# Patient Record
Sex: MALE | Race: WHITE | HISPANIC OR LATINO
[De-identification: names, ages, dates, MRNs, and addresses within clinical notes are randomized per-mention and may not be internally consistent; named-entity substitution may affect disease eponyms.]

---

## 2018-07-06 PROBLEM — Z00.00 ENCOUNTER FOR PREVENTIVE HEALTH EXAMINATION: Status: ACTIVE | Noted: 2018-07-06

## 2018-07-16 ENCOUNTER — APPOINTMENT (OUTPATIENT)
Dept: GASTROENTEROLOGY | Facility: CLINIC | Age: 43
End: 2018-07-16

## 2018-07-30 ENCOUNTER — APPOINTMENT (OUTPATIENT)
Dept: GASTROENTEROLOGY | Facility: CLINIC | Age: 43
End: 2018-07-30
Payer: COMMERCIAL

## 2018-07-30 VITALS
HEIGHT: 70 IN | DIASTOLIC BLOOD PRESSURE: 80 MMHG | SYSTOLIC BLOOD PRESSURE: 120 MMHG | WEIGHT: 230 LBS | BODY MASS INDEX: 32.93 KG/M2 | TEMPERATURE: 98.5 F

## 2018-07-30 DIAGNOSIS — Z82.49 FAMILY HISTORY OF ISCHEMIC HEART DISEASE AND OTHER DISEASES OF THE CIRCULATORY SYSTEM: ICD-10-CM

## 2018-07-30 DIAGNOSIS — Z78.9 OTHER SPECIFIED HEALTH STATUS: ICD-10-CM

## 2018-07-30 DIAGNOSIS — Z86.39 PERSONAL HISTORY OF OTHER ENDOCRINE, NUTRITIONAL AND METABOLIC DISEASE: ICD-10-CM

## 2018-07-30 DIAGNOSIS — Z84.1 FAMILY HISTORY OF DISORDERS OF KIDNEY AND URETER: ICD-10-CM

## 2018-07-30 DIAGNOSIS — Z83.3 FAMILY HISTORY OF DIABETES MELLITUS: ICD-10-CM

## 2018-07-30 DIAGNOSIS — Z80.9 FAMILY HISTORY OF MALIGNANT NEOPLASM, UNSPECIFIED: ICD-10-CM

## 2018-07-30 PROCEDURE — 99243 OFF/OP CNSLTJ NEW/EST LOW 30: CPT

## 2018-07-30 RX ORDER — CHOLECALCIFEROL (VITAMIN D3) 125 MCG
TABLET ORAL
Refills: 0 | Status: ACTIVE | COMMUNITY

## 2018-07-30 RX ORDER — RANITIDINE HYDROCHLORIDE 300 MG/1
300 TABLET, FILM COATED ORAL DAILY
Refills: 0 | Status: ACTIVE | COMMUNITY

## 2018-08-06 ENCOUNTER — APPOINTMENT (OUTPATIENT)
Dept: GASTROENTEROLOGY | Facility: AMBULATORY MEDICAL SERVICES | Age: 43
End: 2018-08-06
Payer: COMMERCIAL

## 2018-08-06 PROCEDURE — 43239 EGD BIOPSY SINGLE/MULTIPLE: CPT

## 2018-08-08 ENCOUNTER — TRANSCRIPTION ENCOUNTER (OUTPATIENT)
Age: 43
End: 2018-08-08

## 2018-08-23 ENCOUNTER — APPOINTMENT (OUTPATIENT)
Dept: GASTROENTEROLOGY | Facility: CLINIC | Age: 43
End: 2018-08-23
Payer: COMMERCIAL

## 2018-08-23 VITALS
SYSTOLIC BLOOD PRESSURE: 120 MMHG | WEIGHT: 226.8 LBS | TEMPERATURE: 98.4 F | DIASTOLIC BLOOD PRESSURE: 80 MMHG | BODY MASS INDEX: 32.47 KG/M2 | HEIGHT: 70 IN

## 2018-08-23 DIAGNOSIS — A04.8 OTHER SPECIFIED BACTERIAL INTESTINAL INFECTIONS: ICD-10-CM

## 2018-08-23 DIAGNOSIS — R19.6 HALITOSIS: ICD-10-CM

## 2018-08-23 DIAGNOSIS — K29.80 DUODENITIS W/OUT BLEEDING: ICD-10-CM

## 2018-08-23 DIAGNOSIS — B96.81 DUODENITIS W/OUT BLEEDING: ICD-10-CM

## 2018-08-23 PROCEDURE — 99213 OFFICE O/P EST LOW 20 MIN: CPT

## 2018-08-23 RX ORDER — OMEPRAZOLE 20 MG/1
20 CAPSULE, DELAYED RELEASE ORAL
Qty: 20 | Refills: 1 | Status: ACTIVE | COMMUNITY
Start: 2018-08-23 | End: 1900-01-01

## 2018-08-23 RX ORDER — CLARITHROMYCIN 500 MG/1
500 TABLET, FILM COATED ORAL TWICE DAILY
Qty: 20 | Refills: 1 | Status: ACTIVE | COMMUNITY
Start: 2018-08-23 | End: 1900-01-01

## 2018-08-23 RX ORDER — BIFIDOBACTERIUM LONGUM 10MM CELL
4 CAPSULE ORAL
Qty: 30 | Refills: 3 | Status: ACTIVE | OUTPATIENT
Start: 2018-08-23

## 2018-08-23 RX ORDER — AMOXICILLIN 500 MG/1
500 CAPSULE ORAL TWICE DAILY
Qty: 40 | Refills: 0 | Status: ACTIVE | COMMUNITY
Start: 2018-08-23 | End: 1900-01-01

## 2018-10-25 ENCOUNTER — APPOINTMENT (OUTPATIENT)
Dept: GASTROENTEROLOGY | Facility: CLINIC | Age: 43
End: 2018-10-25
Payer: COMMERCIAL

## 2018-10-25 ENCOUNTER — LABORATORY RESULT (OUTPATIENT)
Age: 43
End: 2018-10-25

## 2018-10-25 PROCEDURE — 83014 H PYLORI DRUG ADMIN: CPT

## 2018-10-29 LAB — UREA BREATH TEST QL: NEGATIVE

## 2023-05-09 ENCOUNTER — TRANSCRIPTION ENCOUNTER (OUTPATIENT)
Age: 48
End: 2023-05-09

## 2023-09-12 ENCOUNTER — NON-APPOINTMENT (OUTPATIENT)
Age: 48
End: 2023-09-12

## 2023-09-18 ENCOUNTER — NON-APPOINTMENT (OUTPATIENT)
Age: 48
End: 2023-09-18

## 2023-09-19 ENCOUNTER — APPOINTMENT (OUTPATIENT)
Dept: PULMONOLOGY | Facility: CLINIC | Age: 48
End: 2023-09-19
Payer: COMMERCIAL

## 2023-09-19 VITALS
SYSTOLIC BLOOD PRESSURE: 119 MMHG | OXYGEN SATURATION: 96 % | HEIGHT: 70 IN | TEMPERATURE: 98.2 F | DIASTOLIC BLOOD PRESSURE: 71 MMHG | HEART RATE: 71 BPM | WEIGHT: 208 LBS | BODY MASS INDEX: 29.78 KG/M2

## 2023-09-19 DIAGNOSIS — R91.1 SOLITARY PULMONARY NODULE: ICD-10-CM

## 2023-09-19 DIAGNOSIS — R05.9 COUGH, UNSPECIFIED: ICD-10-CM

## 2023-09-19 DIAGNOSIS — F17.200 NICOTINE DEPENDENCE, UNSPECIFIED, UNCOMPLICATED: ICD-10-CM

## 2023-09-19 DIAGNOSIS — Z86.19 PERSONAL HISTORY OF OTHER INFECTIOUS AND PARASITIC DISEASES: ICD-10-CM

## 2023-09-19 PROCEDURE — 94729 DIFFUSING CAPACITY: CPT

## 2023-09-19 PROCEDURE — ZZZZZ: CPT

## 2023-09-19 PROCEDURE — 99204 OFFICE O/P NEW MOD 45 MIN: CPT | Mod: 25

## 2023-09-19 PROCEDURE — 94010 BREATHING CAPACITY TEST: CPT

## 2023-09-19 PROCEDURE — 94727 GAS DIL/WSHOT DETER LNG VOL: CPT

## 2023-09-19 RX ORDER — UMECLIDINIUM BROMIDE AND VILANTEROL TRIFENATATE 62.5; 25 UG/1; UG/1
62.5-25 POWDER RESPIRATORY (INHALATION)
Qty: 1 | Refills: 0 | Status: ACTIVE | COMMUNITY
Start: 2023-09-19 | End: 1900-01-01

## 2023-09-23 PROBLEM — R05.9 COUGH: Status: ACTIVE | Noted: 2023-09-19

## 2023-09-23 PROBLEM — F17.200 CURRENT SMOKER: Status: ACTIVE | Noted: 2018-07-30

## 2023-09-23 PROBLEM — R91.1 LUNG NODULE: Status: ACTIVE | Noted: 2023-09-19

## 2023-10-01 ENCOUNTER — NON-APPOINTMENT (OUTPATIENT)
Age: 48
End: 2023-10-01

## 2023-10-03 ENCOUNTER — NON-APPOINTMENT (OUTPATIENT)
Age: 48
End: 2023-10-03

## 2023-10-08 ENCOUNTER — NON-APPOINTMENT (OUTPATIENT)
Age: 48
End: 2023-10-08

## 2023-10-25 ENCOUNTER — NON-APPOINTMENT (OUTPATIENT)
Age: 48
End: 2023-10-25

## 2023-10-26 ENCOUNTER — APPOINTMENT (OUTPATIENT)
Dept: PULMONOLOGY | Facility: CLINIC | Age: 48
End: 2023-10-26

## 2023-10-29 ENCOUNTER — NON-APPOINTMENT (OUTPATIENT)
Age: 48
End: 2023-10-29

## 2023-11-08 ENCOUNTER — NON-APPOINTMENT (OUTPATIENT)
Age: 48
End: 2023-11-08

## 2023-12-13 ENCOUNTER — NON-APPOINTMENT (OUTPATIENT)
Age: 48
End: 2023-12-13

## 2025-05-31 ENCOUNTER — EMERGENCY (EMERGENCY)
Facility: HOSPITAL | Age: 50
LOS: 1 days | End: 2025-05-31
Attending: STUDENT IN AN ORGANIZED HEALTH CARE EDUCATION/TRAINING PROGRAM
Payer: COMMERCIAL

## 2025-05-31 VITALS
RESPIRATION RATE: 18 BRPM | TEMPERATURE: 98 F | DIASTOLIC BLOOD PRESSURE: 68 MMHG | HEART RATE: 56 BPM | SYSTOLIC BLOOD PRESSURE: 117 MMHG | OXYGEN SATURATION: 97 %

## 2025-05-31 VITALS
HEART RATE: 59 BPM | SYSTOLIC BLOOD PRESSURE: 165 MMHG | OXYGEN SATURATION: 99 % | TEMPERATURE: 99 F | RESPIRATION RATE: 17 BRPM | DIASTOLIC BLOOD PRESSURE: 101 MMHG

## 2025-05-31 LAB
ALBUMIN SERPL ELPH-MCNC: 4.6 G/DL — SIGNIFICANT CHANGE UP (ref 3.3–5)
ALP SERPL-CCNC: 97 U/L — SIGNIFICANT CHANGE UP (ref 40–120)
ALT FLD-CCNC: 41 U/L — SIGNIFICANT CHANGE UP (ref 10–45)
ANION GAP SERPL CALC-SCNC: 15 MMOL/L — SIGNIFICANT CHANGE UP (ref 5–17)
APTT BLD: 30.9 SEC — SIGNIFICANT CHANGE UP (ref 26.1–36.8)
AST SERPL-CCNC: 19 U/L — SIGNIFICANT CHANGE UP (ref 10–40)
BASOPHILS # BLD AUTO: 0.05 K/UL — SIGNIFICANT CHANGE UP (ref 0–0.2)
BASOPHILS NFR BLD AUTO: 0.7 % — SIGNIFICANT CHANGE UP (ref 0–2)
BILIRUB SERPL-MCNC: 0.3 MG/DL — SIGNIFICANT CHANGE UP (ref 0.2–1.2)
BUN SERPL-MCNC: 9 MG/DL — SIGNIFICANT CHANGE UP (ref 7–23)
CALCIUM SERPL-MCNC: 9.7 MG/DL — SIGNIFICANT CHANGE UP (ref 8.4–10.5)
CHLORIDE SERPL-SCNC: 104 MMOL/L — SIGNIFICANT CHANGE UP (ref 96–108)
CO2 SERPL-SCNC: 20 MMOL/L — LOW (ref 22–31)
CREAT SERPL-MCNC: 0.62 MG/DL — SIGNIFICANT CHANGE UP (ref 0.5–1.3)
EGFR: 116 ML/MIN/1.73M2 — SIGNIFICANT CHANGE UP
EGFR: 116 ML/MIN/1.73M2 — SIGNIFICANT CHANGE UP
EOSINOPHIL # BLD AUTO: 0.1 K/UL — SIGNIFICANT CHANGE UP (ref 0–0.5)
EOSINOPHIL NFR BLD AUTO: 1.4 % — SIGNIFICANT CHANGE UP (ref 0–6)
GLUCOSE SERPL-MCNC: 134 MG/DL — HIGH (ref 70–99)
HCT VFR BLD CALC: 47 % — SIGNIFICANT CHANGE UP (ref 39–50)
HGB BLD-MCNC: 16.2 G/DL — SIGNIFICANT CHANGE UP (ref 13–17)
IMM GRANULOCYTES NFR BLD AUTO: 0.5 % — SIGNIFICANT CHANGE UP (ref 0–0.9)
INR BLD: 0.98 RATIO — SIGNIFICANT CHANGE UP (ref 0.85–1.16)
LYMPHOCYTES # BLD AUTO: 1.8 K/UL — SIGNIFICANT CHANGE UP (ref 1–3.3)
LYMPHOCYTES # BLD AUTO: 24.5 % — SIGNIFICANT CHANGE UP (ref 13–44)
MCHC RBC-ENTMCNC: 29.6 PG — SIGNIFICANT CHANGE UP (ref 27–34)
MCHC RBC-ENTMCNC: 34.5 G/DL — SIGNIFICANT CHANGE UP (ref 32–36)
MCV RBC AUTO: 85.9 FL — SIGNIFICANT CHANGE UP (ref 80–100)
MONOCYTES # BLD AUTO: 0.68 K/UL — SIGNIFICANT CHANGE UP (ref 0–0.9)
MONOCYTES NFR BLD AUTO: 9.2 % — SIGNIFICANT CHANGE UP (ref 2–14)
NEUTROPHILS # BLD AUTO: 4.69 K/UL — SIGNIFICANT CHANGE UP (ref 1.8–7.4)
NEUTROPHILS NFR BLD AUTO: 63.7 % — SIGNIFICANT CHANGE UP (ref 43–77)
NRBC BLD AUTO-RTO: 0 /100 WBCS — SIGNIFICANT CHANGE UP (ref 0–0)
PLATELET # BLD AUTO: 203 K/UL — SIGNIFICANT CHANGE UP (ref 150–400)
POTASSIUM SERPL-MCNC: 4.4 MMOL/L — SIGNIFICANT CHANGE UP (ref 3.5–5.3)
POTASSIUM SERPL-SCNC: 4.4 MMOL/L — SIGNIFICANT CHANGE UP (ref 3.5–5.3)
PROT SERPL-MCNC: 7.7 G/DL — SIGNIFICANT CHANGE UP (ref 6–8.3)
PROTHROM AB SERPL-ACNC: 11.3 SEC — SIGNIFICANT CHANGE UP (ref 9.9–13.4)
RBC # BLD: 5.47 M/UL — SIGNIFICANT CHANGE UP (ref 4.2–5.8)
RBC # FLD: 12.3 % — SIGNIFICANT CHANGE UP (ref 10.3–14.5)
SODIUM SERPL-SCNC: 139 MMOL/L — SIGNIFICANT CHANGE UP (ref 135–145)
TROPONIN T, HIGH SENSITIVITY RESULT: <6 NG/L — SIGNIFICANT CHANGE UP (ref 0–51)
WBC # BLD: 7.36 K/UL — SIGNIFICANT CHANGE UP (ref 3.8–10.5)
WBC # FLD AUTO: 7.36 K/UL — SIGNIFICANT CHANGE UP (ref 3.8–10.5)

## 2025-05-31 PROCEDURE — 83605 ASSAY OF LACTIC ACID: CPT

## 2025-05-31 PROCEDURE — 84295 ASSAY OF SERUM SODIUM: CPT

## 2025-05-31 PROCEDURE — 85025 COMPLETE CBC W/AUTO DIFF WBC: CPT

## 2025-05-31 PROCEDURE — 36000 PLACE NEEDLE IN VEIN: CPT | Mod: XU

## 2025-05-31 PROCEDURE — 84484 ASSAY OF TROPONIN QUANT: CPT

## 2025-05-31 PROCEDURE — 93010 ELECTROCARDIOGRAM REPORT: CPT

## 2025-05-31 PROCEDURE — 85610 PROTHROMBIN TIME: CPT

## 2025-05-31 PROCEDURE — 70498 CT ANGIOGRAPHY NECK: CPT

## 2025-05-31 PROCEDURE — 85018 HEMOGLOBIN: CPT

## 2025-05-31 PROCEDURE — 70496 CT ANGIOGRAPHY HEAD: CPT

## 2025-05-31 PROCEDURE — 82962 GLUCOSE BLOOD TEST: CPT

## 2025-05-31 PROCEDURE — 85730 THROMBOPLASTIN TIME PARTIAL: CPT

## 2025-05-31 PROCEDURE — 70498 CT ANGIOGRAPHY NECK: CPT | Mod: 26

## 2025-05-31 PROCEDURE — 82435 ASSAY OF BLOOD CHLORIDE: CPT

## 2025-05-31 PROCEDURE — 99285 EMERGENCY DEPT VISIT HI MDM: CPT

## 2025-05-31 PROCEDURE — 70450 CT HEAD/BRAIN W/O DYE: CPT | Mod: 26,59

## 2025-05-31 PROCEDURE — 93005 ELECTROCARDIOGRAM TRACING: CPT

## 2025-05-31 PROCEDURE — 80053 COMPREHEN METABOLIC PANEL: CPT

## 2025-05-31 PROCEDURE — 0042T: CPT

## 2025-05-31 PROCEDURE — 82803 BLOOD GASES ANY COMBINATION: CPT

## 2025-05-31 PROCEDURE — 84132 ASSAY OF SERUM POTASSIUM: CPT

## 2025-05-31 PROCEDURE — 70450 CT HEAD/BRAIN W/O DYE: CPT

## 2025-05-31 PROCEDURE — 82330 ASSAY OF CALCIUM: CPT

## 2025-05-31 PROCEDURE — 85014 HEMATOCRIT: CPT

## 2025-05-31 PROCEDURE — 82947 ASSAY GLUCOSE BLOOD QUANT: CPT

## 2025-05-31 PROCEDURE — 70496 CT ANGIOGRAPHY HEAD: CPT | Mod: 26

## 2025-05-31 PROCEDURE — 99285 EMERGENCY DEPT VISIT HI MDM: CPT | Mod: 25

## 2025-05-31 RX ORDER — ONDANSETRON HCL/PF 4 MG/2 ML
1 VIAL (ML) INJECTION
Qty: 2 | Refills: 0
Start: 2025-05-31 | End: 2025-06-04

## 2025-05-31 RX ORDER — MECLIZINE HCL 12.5 MG
1 TABLET ORAL
Qty: 21 | Refills: 0
Start: 2025-05-31 | End: 2025-06-06

## 2025-05-31 RX ORDER — MECLIZINE HCL 12.5 MG
25 TABLET ORAL ONCE
Refills: 0 | Status: COMPLETED | OUTPATIENT
Start: 2025-05-31 | End: 2025-05-31

## 2025-05-31 RX ORDER — MECLIZINE HCL 12.5 MG
1 TABLET ORAL
Refills: 0
Start: 2025-05-31

## 2025-05-31 RX ADMIN — Medication 1000 MILLILITER(S): at 13:41

## 2025-05-31 RX ADMIN — Medication 25 MILLIGRAM(S): at 13:40

## 2025-05-31 NOTE — ED PROVIDER NOTE - PROGRESS NOTE DETAILS
Kady Reyes (Rodriguez),  pt feeling improved cleared by neurology for dc with outpatient follow up. feeling complete resolution of symptoms.

## 2025-05-31 NOTE — ED PROVIDER NOTE - NSFOLLOWUPINSTRUCTIONS_ED_ALL_ED_FT
You were seen in the Emergency Department for dizziness.     1) Advance activity as tolerated.   2) Continue all previously prescribed medications as directed.    3) Follow up with your primary care physician in 24-48 hours - take copies of your results.    4) Return to the Emergency Department for worsening or persistent symptoms, and/or ANY NEW OR CONCERNING SYMPTOMS. You were seen in the Emergency Department for dizziness.     1) Advance activity as tolerated.   2) Continue all previously prescribed medications as directed.    3) Follow up with your primary care physician in 24-48 hours - take copies of your results.    4) Return to the Emergency Department for worsening or persistent symptoms, and/or ANY NEW OR CONCERNING SYMPTOMS.     follow up with general neurology at [address] [place] [address] within 1 week of discharge

## 2025-05-31 NOTE — ED PROVIDER NOTE - PATIENT PORTAL LINK FT
You can access the FollowMyHealth Patient Portal offered by [facility] by registering at the following website: [email or web address]. By joining [employer]’s FollowMyHealth portal, you will also be able to view your health information using other applications (apps) compatible with our system.

## 2025-05-31 NOTE — ED ADULT NURSE REASSESSMENT NOTE - NS ED NURSE REASSESS COMMENT FT1
AOx4, breathing spontnaeous. Pt reports decreased dizziness and nausea. Pt is able to walk without feeling dizzy or unsteady. Safety and comfort measures maintained.

## 2025-05-31 NOTE — CONSULT NOTE ADULT - ASSESSMENT
Assessment: 49y (1975) man, LH, hx of HTN, DMII, tension headaches, current smoker, who presents to [facility] ED, code stroke called for new onset dizziness. Patient reports he was at his baseline when he went to bed at 5/30/25 2230. He woke up at 3AM on 5/31/25 feeling room-spinning sensation. The room spinning sensation worsens with head movement. Patient feels a bit dizzy when walking but is able to ambulate. Patient had nausea and one episode of vomiting this morning, and he complains of abdominal soreness today. At home, his BP was 128/70 and HR 53. In ED, his BP was 165/101/ HR 59. RR 17. Temp 98.7. O2 99% on RA. Patient reports he never had dizziness in the past. No history of diagnosed migraines though he sometimes gets tension headaches, and currently is having a very mild headache across his forehead b/l. He does not follow with a neurologist. Patient reports no fevers, chills, diarrhea, constipation, changes in urination, double vision, changes in vision, hearing, smell, taste, sore throat, runny nose, chest pain currently, back pain, pain in the arms or legs, weakness in the arms or legs, difficulty talking, difficulty walking, difficulty with coordination. Patient takes metformin BID but no other medications. He denies any allergies. He denies recent travel. He denies surgical hx. He smokes 4-5 cigarettes a day x 33 years. 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 FH includes heart disease, kidney disease, DM, strokes, endometrial cancer. ROS as above.      mRS: 0  LKN: 2230 5/30/25  NIHSS: 0    CTH no bleed. CTP 0 core and 0 penumbra. CTA no LVO    Impression: New onset vertigo, improving, likely peripheral/metabolic in etiology. Unlikely to be central ischemia as symptoms are intermittent and there is a lack of focality to the physical examination. Evaluate for cardiac etiology, toxic metabolic etiology.     Recommendations: --INCOMPLETE  [] Follow up official CTH/CTA reports  [] can monitor for improvement in ED or CDU  [] evaluation of abdominal soreness per primary team  [] toxic metabolic infectious workup if appropriate per primary team  [] orthostatic vitals, EKG, TTE, cardiac workup per primary team  [] CBC, CMP, TSH, FT4, Lipid panel, HbA1c  [] Start ASA 81 mg PO --INCOMPLETE  [] Start Atorvastatin 80mg (titrate to LDL < 70)   [] DVT prophylaxis per primary team  [] NPO unless passes dysphagia screen; swallow eval if fails  [] Keep BP permissive up to 220/110 for 48 hours followed by gradual normotension over 2-3 days   [] Telemonitoring  [] Neurological checks and vital signs per unit protocol  [] BGM goals 140-180  [] PT/OT  [] can trial symptomatic management with IVF, meclizine, reglan+benadryl.  [] outpatient [facility].   [] consider ENT outpt eval  [] Patient can follow up with general neurology at [address] [place] [address] within 1 weeks of discharge and when medically cleared. Please instruct the patient to call [phone] to schedule this appointment.    Case and plan discussed with stroke fellow. Patient not a tenecteplase candidate as outside the therapeutic time frame. Not a thrombectomy candidate as no LVO on imaging.     * Case and plan not final until Attending attestation * Assessment: 49y (1975) man, LH, hx of HTN, DMII, tension headaches, current smoker, who presents to [facility] ED, code stroke called for new onset dizziness. Patient reports he was at his baseline when he went to bed at 5/30/25 2230. He woke up at 3AM on 5/31/25 feeling room-spinning sensation. The room spinning sensation worsens with head movement. Patient feels a bit dizzy when walking but is able to ambulate. Patient had nausea and one episode of vomiting this morning, and he complains of abdominal soreness today. At home, his BP was 128/70 and HR 53. In ED, his BP was 165/101/ HR 59. RR 17. Temp 98.7. O2 99% on RA. Patient reports he never had dizziness in the past. No history of diagnosed migraines though he sometimes gets tension headaches, and currently is having a very mild headache across his forehead b/l. He does not follow with a neurologist. Patient reports no fevers, chills, diarrhea, constipation, changes in urination, double vision, changes in vision, hearing, smell, taste, sore throat, runny nose, chest pain currently, back pain, pain in the arms or legs, weakness in the arms or legs, difficulty talking, difficulty walking, difficulty with coordination. Patient takes metformin BID but no other medications. He denies any allergies. He denies recent travel. He denies surgical hx. He smokes 4-5 cigarettes a day x 33 years. 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 He works in retail. FH includes heart disease, kidney disease, DM, strokes, endometrial cancer. ROS as above.      mRS: 0  LKN: 2230 5/30/25  NIHSS: 0    CTH no bleed. CTP 0 core and 0 penumbra. CTA no LVO    Impression: New onset vertigo, resolved, likely peripheral/metabolic in etiology. Unlikely to be central ischemia as symptoms are intermittent and there is a lack of focality to the physical examination. Evaluate for cardiac etiology, toxic metabolic etiology.     Recommendations:   [] Follow up official CTA report. Please call [phone] once report is in.   [] can monitor for improvement in ED    [] evaluation of abdominal soreness per primary team  [] toxic metabolic infectious workup if appropriate per primary team  [] orthostatic vitals, EKG   [] TTE, cardiac workup per primary team  [] CBC, CMP, TSH, FT4, Lipid panel, HbA1c  [] DVT prophylaxis per primary team  [] NPO unless passes dysphagia screen; swallow eval if fails  [] BGM goals 140-180  [] can trial symptomatic management with IVF, meclizine, reglan+benadryl.  [] outpatient [facility].   [] consider ENT outpt eval  [] Patient can follow up with general neurology at [address] [place] [address] within 1 weeks of discharge and when medically cleared. Please instruct the patient to call [phone] to schedule this appointment.    Case and plan discussed with stroke fellow Dr. Adams. Patient not a tenecteplase candidate as outside the therapeutic time frame. Not a thrombectomy candidate as no LVO on imaging. Overall clinical picture seems more consistent with a peripheral etiology of vertigo.   Patient reports his vertigo has resolved. No focal neuro deficits on neuro exam. If official CTA read is negative for any findings, patient cleared from neurological perspective for outpatient neuro followup, once medically cleared.    * Case and plan not final until Attending attestation *

## 2025-05-31 NOTE — CONSULT NOTE ADULT - SUBJECTIVE AND OBJECTIVE BOX
Neurology - Consult Note    -  Spectra: [phone] ([facility]), [phone] ([facility])  -    HPI: Patient MASHA LINDSEY is a 49y (1975) man, LH, hx of HTN, DMII, tension headaches, current smoker, who presents to [facility] ED, code stroke called for new onset dizziness. Patient reports he was at his baseline when he went to bed at 5/30/25 2230. He woke up at 3AM on 5/31/25 feeling room-spinning sensation. The room spinning sensation worsens with head movement. Patient feels a bit dizzy when walking but is able to ambulate. Patient had nausea and one episode of vomiting this morning, and he complains of abdominal soreness today. At home, his BP was 128/70 and HR 53. In ED, his BP was 165/101/ HR 59. RR 17. Temp 98.7. O2 99% on RA. Patient reports he never had dizziness in the past. No history of diagnosed migraines though he sometimes gets tension headaches, and currently is having a very mild headache across his forehead b/l. He does not follow with a neurologist. Patient reports no fevers, chills, diarrhea, constipation, changes in urination, double vision, changes in vision, hearing, smell, taste, sore throat, runny nose, chest pain currently, back pain, pain in the arms or legs, weakness in the arms or legs, difficulty talking, difficulty walking, difficulty with coordination. Patient takes metformin BID but no other medications. He denies any allergies. He denies recent travel. He denies surgical hx. He smokes 4-5 cigarettes a day x 33 years. 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 FH includes heart disease, kidney disease, DM, strokes, endometrial cancer. ROS as above.        Review of Systems:    CONSTITUTIONAL: No fevers or chills  EYES AND ENT: No visual changes or no throat pain   NECK: No pain or stiffness  RESPIRATORY: No hemoptysis or shortness of breath  CARDIOVASCULAR: No chest pain or palpitations  GASTROINTESTINAL: No melena or hematochezia  GENITOURINARY: No dysuria or hematuria  NEUROLOGICAL: +As stated in HPI above  SKIN: No itching, burning, rashes, or lesions      Allergies:  No Known Allergies         Medications:  MEDICATIONS  (STANDING):  meclizine 25 milliGRAM(s) Oral Once  sodium chloride 0.9% Bolus 1000 milliLiter(s) IV Bolus once    MEDICATIONS  (PRN):      Vitals:  T(C): 37.1 (05-31-25 @ 12:50), Max: 37.1 (05-31-25 @ 12:50)  HR: 59 (05-31-25 @ 12:50) (59 - 59)  BP: 165/101 (05-31-25 @ 12:50) (165/101 - 165/101)  RR: 17 (05-31-25 @ 12:50) (17 - 17)  SpO2: 99% (05-31-25 @ 12:50) (99% - 99%)    Physical Examination:   General - NAD     Neurologic Exam:  Mental status - Awake, Alert, Oriented to person, place, and time. Speech fluent, repetition and naming intact. Follows simple commands. Attention intact    Cranial nerves - VFF to FC, EOMI, face sensation (V1-V3) intact b/l, facial strength intact without asymmetry b/l, hearing intact b/l, palate with symmetric elevation, trapezius 5/5 strength b/l, tongue midline on protrusion with full lateral movement    Motor - Normal bulk and tone throughout. No pronator drift.    Strength testing            Deltoid      Biceps      Triceps              R            5                 5               5                     5                                           L             5                 5               5                     5                                                                Hip Flexion     Knee Flexion    Knee Extension    Dorsiflexion    Plantar Flexion  R              5                           5                       5                           5                            5                           L              5                           5                        5                           5                            5                            Sensation - Light touch intact throughout    DTR's - deferred during code     Coordination - Finger to Nose intact b/l. HTS intact. No tremors appreciated    Gait and station - Normal casual gait. Normal heel and tiptoe and tandem gait.     Labs:                        16.2   7.36  )-----------( 203      ( 31 May 2025 13:02 )             47.0           CAPILLARY BLOOD GLUCOSE      POCT Blood Glucose.: 125 mg/dL (31 May 2025 12:55)        PT/INR - ( 31 May 2025 13:02 )   PT: 11.3 sec;   INR: 0.98 ratio         PTT - ( 31 May 2025 13:02 )  PTT:30.9 sec  CSF:                  Radiology:     Neurology - Consult Note    -  Spectra: [phone] ([facility]), [phone] ([facility])  -    HPI: Patient MASHA LINDSEY is a 49y (1975) man, LH, hx of HTN, DMII, tension headaches, current smoker, who presents to [facility] ED, code stroke called for new onset dizziness. Patient reports he was at his baseline when he went to bed at 5/30/25 2230. He woke up at 3AM on 5/31/25 feeling room-spinning sensation. The room spinning sensation worsens with head movement. Patient feels a bit dizzy when walking but is able to ambulate. Patient had nausea and one episode of vomiting this morning, and he complains of abdominal soreness today. At home, his BP was 128/70 and HR 53. In ED, his BP was 165/101/ HR 59. RR 17. Temp 98.7. O2 99% on RA. Patient reports he never had dizziness in the past. No history of diagnosed migraines though he sometimes gets tension headaches, and currently is having a very mild headache across his forehead b/l. He does not follow with a neurologist. Patient reports no fevers, chills, diarrhea, constipation, changes in urination, double vision, changes in vision, hearing, smell, taste, sore throat, runny nose, chest pain currently, back pain, pain in the arms or legs, weakness in the arms or legs, difficulty talking, difficulty walking, difficulty with coordination. Patient takes metformin BID but no other medications. He denies any allergies. He denies recent travel. He denies surgical hx. He smokes 4-5 cigarettes a day x 33 years. 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 FH includes heart disease, kidney disease, DM, strokes, endometrial cancer. ROS as above.      Patient states his vertigo currently resolved.    Review of Systems:    CONSTITUTIONAL: No fevers or chills  EYES AND ENT: No visual changes or no throat pain   NECK: No pain or stiffness  RESPIRATORY: No hemoptysis or shortness of breath  CARDIOVASCULAR: No chest pain or palpitations  GASTROINTESTINAL: No melena or hematochezia  GENITOURINARY: No dysuria or hematuria  NEUROLOGICAL: +As stated in HPI above  SKIN: No itching, burning, rashes, or lesions      Allergies:  No Known Allergies         Medications:  MEDICATIONS  (STANDING):  meclizine 25 milliGRAM(s) Oral Once  sodium chloride 0.9% Bolus 1000 milliLiter(s) IV Bolus once    MEDICATIONS  (PRN):      Vitals:  T(C): 37.1 (05-31-25 @ 12:50), Max: 37.1 (05-31-25 @ 12:50)  HR: 59 (05-31-25 @ 12:50) (59 - 59)  BP: 165/101 (05-31-25 @ 12:50) (165/101 - 165/101)  RR: 17 (05-31-25 @ 12:50) (17 - 17)  SpO2: 99% (05-31-25 @ 12:50) (99% - 99%)    Physical Examination:   General - NAD     Neurologic Exam:  Mental status - Awake, Alert, Oriented to person, place, and time. Speech fluent, repetition and naming intact. Follows simple commands. Attention intact    Cranial nerves - VFF to FC, EOMI, face sensation (V1-V3) intact b/l, facial strength intact without asymmetry b/l, hearing intact b/l, palate with symmetric elevation, trapezius 5/5 strength b/l, tongue midline on protrusion with full lateral movement    Motor - Normal bulk and tone throughout. No pronator drift.    Strength testing            Deltoid      Biceps      Triceps              R            5                 5               5                     5                                           L             5                 5               5                     5                                                                Hip Flexion     Knee Flexion    Knee Extension    Dorsiflexion    Plantar Flexion  R              5                           5                       5                           5                            5                           L              5                           5                        5                           5                            5                            Sensation - Light touch intact throughout    DTR's - deferred during code     Coordination - Finger to Nose intact b/l. HTS intact. No tremors appreciated    Gait and station - Normal casual gait. Normal heel and tiptoe and tandem gait.     HINT neg for nystagmus or saccades.  Saul-Hallpike negative.       Labs:                        16.2   7.36  )-----------( 203      ( 31 May 2025 13:02 )             47.0           CAPILLARY BLOOD GLUCOSE      POCT Blood Glucose.: 125 mg/dL (31 May 2025 12:55)        PT/INR - ( 31 May 2025 13:02 )   PT: 11.3 sec;   INR: 0.98 ratio         PTT - ( 31 May 2025 13:02 )  PTT:30.9 sec  CSF:                  Radiology:

## 2025-05-31 NOTE — ED PROVIDER NOTE - NS ED ROS FT
Constitutional: no fevers, chills  HEENT: +HA, no vision changes, rhinorrhea, sore throat  Cardiac: no chest pain, palpitations  Respiratory: no SOB, cough or hemoptysis  GI: +n/v no diarrhea/constipation, +abd pain, no bloody or dark stools  : no dysuria, frequency, or hematuria  MSK: no joint pain, neck pain or back pain  Skin: no rashes, jaundice, pruritis  Neuro: no numbness/tingling, weakness, unsteady gait  ROS otherwise neg except per MDM

## 2025-05-31 NOTE — ED ADULT NURSE NOTE - OBJECTIVE STATEMENT
Pt 49y male complaining of dizziness. PMH HTN, DM. Code stroke called. AOx4, complains of dizziness and the room spinning when he woke up around 3 am. Pt reports he felt clammy, had a headache, SOB, and nausea. Pt had 1 episode of emesis. States the dizziness got worse when he tried laying flat. States around 6am pt had partial relief from symptoms but continues to have dizziness and nausea. Denies CP, SOB, vision changes, numbness. Breathing spontaenous, chest rise symmetrical. Abd soft, rounded. Senstation intact. Skin warm, dry, intact. Pt is Ambulatory and full ROM. Pt 49y male complaining of dizziness. PMH HTN, DM. Code stroke called 1248. AOx4, complains of dizziness and the room spinning when he woke up around 3 am. Pt reports he felt clammy, had a headache, SOB, and nausea. Pt had 1 episode of emesis. States the dizziness got worse when he tried laying flat. States around 6am pt had partial relief from symptoms but continues to have dizziness and nausea. Denies CP, SOB, vision changes, numbness. Breathing spontaneous, chest rise symmetrical. Abd soft, rounded. Sensation intact. Skin warm, dry, intact. Pt is Ambulatory and full ROM. Safety and comfort measures provided.

## 2025-05-31 NOTE — ED PROVIDER NOTE - NSFOLLOWUPCLINICS_GEN_ALL_ED_FT
[facility]  Neurology Autoimmune Encephalitis  [address] [place] [id number]  Phone: [phone]  Fax: [phone]    [facility] - ENT  Otolaryngology (ENT)  [address] [place] [address]  Phone: [phone]  Fax:

## 2025-05-31 NOTE — ED PROVIDER NOTE - CLINICAL SUMMARY MEDICAL DECISION MAKING FREE TEXT BOX
49y male hx HTN DM presents as a code stroke for dizziness present on awakening this morning. LKW 2230 last evening. no fever chills congestion, numbness tingling weakness imbalance or falls, however notes room spinning worse in supine and with head movement associated with few episodes of nbnb emesis now with some mild abdominal soreness. on arrival, per neuro full code stroke workup, clear cardiopulmonary exam, benign abdomen, NIHSS 0, ambulatory with intact heel, toe, and tandem gaits. no dysmetria. unlikely central. treat with hydration and meclizine, likely dc.

## 2025-05-31 NOTE — ED PROVIDER NOTE - PHYSICAL EXAMINATION
General: non-toxic, NAD  HEENT: NCAT, PERRL  Cardiac: RRR, no murmurs, 2+ peripheral pulses  Resp: CTAB  Abdomen: soft, non-distended, bowel sounds present, no ttp, no rebound or guarding. no organomegaly  Extremities: no peripheral edema, calf tenderness, or leg size discrepancies  Skin: no rashes  Neuro: AAOx4, 5+motor, sensation grossly intact CN 2-12 intact, no dysmetria to finger nose or heel shin testing. NIHSS 0  Psych: mood and affect appropriate

## 2025-05-31 NOTE — ED ADULT TRIAGE NOTE - CHIEF COMPLAINT QUOTE
woke up feeling dizzy and nauseous and the room spinning has high bp never been prescribed meds, one episode of vomiting and abd pain

## 2025-09-04 ENCOUNTER — NON-APPOINTMENT (OUTPATIENT)
Age: 50
End: 2025-09-04

## 2025-09-04 ENCOUNTER — APPOINTMENT (OUTPATIENT)
Dept: OTOLARYNGOLOGY | Facility: CLINIC | Age: 50
End: 2025-09-04
Payer: COMMERCIAL

## 2025-09-04 VITALS
BODY MASS INDEX: 32.93 KG/M2 | HEIGHT: 70 IN | HEART RATE: 95 BPM | SYSTOLIC BLOOD PRESSURE: 121 MMHG | WEIGHT: 230 LBS | DIASTOLIC BLOOD PRESSURE: 80 MMHG

## 2025-09-04 DIAGNOSIS — R42 DIZZINESS AND GIDDINESS: ICD-10-CM

## 2025-09-04 DIAGNOSIS — H81.12 BENIGN PAROXYSMAL VERTIGO, LEFT EAR: ICD-10-CM

## 2025-09-04 PROCEDURE — 99204 OFFICE O/P NEW MOD 45 MIN: CPT | Mod: 25

## 2025-09-04 PROCEDURE — 92567 TYMPANOMETRY: CPT

## 2025-09-04 PROCEDURE — 92557 COMPREHENSIVE HEARING TEST: CPT

## 2025-09-04 PROCEDURE — 95992 CANALITH REPOSITIONING PROC: CPT | Mod: LT

## 2025-09-04 RX ORDER — METFORMIN HYDROCHLORIDE 750 MG/1
TABLET ORAL
Refills: 0 | Status: ACTIVE | COMMUNITY